# Patient Record
Sex: MALE | Race: WHITE | NOT HISPANIC OR LATINO | Employment: FULL TIME | URBAN - NONMETROPOLITAN AREA
[De-identification: names, ages, dates, MRNs, and addresses within clinical notes are randomized per-mention and may not be internally consistent; named-entity substitution may affect disease eponyms.]

---

## 2024-02-29 ENCOUNTER — OFFICE VISIT (OUTPATIENT)
Dept: FAMILY MEDICINE CLINIC | Age: 34
End: 2024-02-29
Payer: COMMERCIAL

## 2024-02-29 VITALS
WEIGHT: 283 LBS | SYSTOLIC BLOOD PRESSURE: 117 MMHG | OXYGEN SATURATION: 98 % | HEART RATE: 68 BPM | BODY MASS INDEX: 39.62 KG/M2 | HEIGHT: 71 IN | DIASTOLIC BLOOD PRESSURE: 76 MMHG

## 2024-02-29 DIAGNOSIS — F41.9 ANXIETY AND DEPRESSION: Primary | ICD-10-CM

## 2024-02-29 DIAGNOSIS — Z13.220 SCREENING CHOLESTEROL LEVEL: ICD-10-CM

## 2024-02-29 DIAGNOSIS — R53.83 OTHER FATIGUE: ICD-10-CM

## 2024-02-29 DIAGNOSIS — F32.A ANXIETY AND DEPRESSION: Primary | ICD-10-CM

## 2024-02-29 DIAGNOSIS — N52.9 ERECTILE DYSFUNCTION, UNSPECIFIED ERECTILE DYSFUNCTION TYPE: ICD-10-CM

## 2024-02-29 PROCEDURE — 99214 OFFICE O/P EST MOD 30 MIN: CPT | Performed by: NURSE PRACTITIONER

## 2024-02-29 RX ORDER — TADALAFIL 10 MG/1
10 TABLET ORAL DAILY PRN
Qty: 30 TABLET | Refills: 0 | Status: SHIPPED | OUTPATIENT
Start: 2024-02-29

## 2024-02-29 RX ORDER — QUETIAPINE FUMARATE 25 MG/1
TABLET, FILM COATED ORAL
Qty: 90 TABLET | Refills: 1 | Status: SHIPPED | OUTPATIENT
Start: 2024-02-29

## 2024-02-29 RX ORDER — SERTRALINE HYDROCHLORIDE 100 MG/1
100 TABLET, FILM COATED ORAL DAILY
Qty: 90 TABLET | Refills: 1 | Status: SHIPPED | OUTPATIENT
Start: 2024-02-29

## 2024-02-29 NOTE — ASSESSMENT & PLAN NOTE
Patient and wife report all symptoms are stable.  He was to continue current treatment.  If symptoms do not remain stable I will refer him to behavioral health.  Patient verbalizes understanding.

## 2024-02-29 NOTE — ASSESSMENT & PLAN NOTE
If testosterone remains low we should either refer him to urology or endocrinology.  Patient verbalized understanding.

## 2024-02-29 NOTE — PROGRESS NOTES
"Chief Complaint  Anxiety (1 month follow up ), Depression, Fatigue, and Results (Lab results)    Subjective          Jamin Alves presents to [facility]     Patient is a 33-year-old male who is here today to follow-up regarding anxiety, depression, insomnia, and erectile dysfunction.  Recent labs were normal with exception of a mild low testosterone level.  He would like to be tested for sleep apnea due to ongoing concerns with fatigue.  Mood is improved and he wants to continue on quetiapine 25 mg at bedtime, sertraline 100 mg daily.  Regarding erectile dysfunction, sildenafil not effective and switched to Cialis with his last visit.  5 mg daily as needed.  Insurance only allows 8 tablets/month.  He would like to try higher dose of Cialis.     Objective   Vital Signs:   Vitals:    02/29/24 1548   BP: 117/76   BP Location: Left arm   Patient Position: Sitting   Cuff Size: Large Adult   Pulse: 68   SpO2: 98%   Weight: 128 kg (283 lb)   Height: 179.1 cm (70.5\")       Wt Readings from Last 3 Encounters:   02/29/24 128 kg (283 lb)   01/23/24 127 kg (280 lb)   01/17/24 125 kg (275 lb)      BP Readings from Last 3 Encounters:   02/29/24 117/76   01/23/24 121/81   01/17/24 131/85       Body mass index is 40.03 kg/m².           Physical Exam  Vitals reviewed.   Constitutional:       General: He is not in acute distress.     Appearance: Normal appearance. He is well-developed. He is obese.   Cardiovascular:      Rate and Rhythm: Normal rate and regular rhythm.      Heart sounds: Normal heart sounds.   Pulmonary:      Effort: Pulmonary effort is normal.      Breath sounds: Normal breath sounds.   Musculoskeletal:      Right lower leg: No edema.      Left lower leg: No edema.   Skin:     General: Skin is warm and dry.   Neurological:      General: No focal deficit present.      Mental Status: He is alert.   Psychiatric:         Attention and Perception: Attention normal.         Mood and " Affect: Mood and affect normal.         Behavior: Behavior normal.           Current Outpatient Medications:     QUEtiapine (SEROquel) 25 MG tablet, Take one half tablet at bedtime x 1 week then increase to 1 tablet at bedtime, Disp: 90 tablet, Rfl: 1    sertraline (Zoloft) 100 MG tablet, Take 1 tablet by mouth Daily., Disp: 90 tablet, Rfl: 1    tadalafil (Cialis) 10 MG tablet, Take 1 tablet by mouth Daily As Needed for Erectile Dysfunction., Disp: 30 tablet, Rfl: 0   Past Medical History:   Diagnosis Date    Anxiety and depression     COVID         Insomnia     Pilonidal cyst     Vasovagal syncope     IN HIGH SCHOOL     Allergies   Allergen Reactions    Prazosin Other (See Comments)     Night terrors               Result Review :     Common labs          3/30/2023    13:07 1/23/2024    11:15   Common Labs   Glucose 78  93    BUN 16  16    Creatinine 0.80  0.87    Sodium 139  139    Potassium 4.1  4.0    Chloride 102  103    Calcium 9.6  9.6    Total Protein 7.0     Albumin 5.0  4.3    Total Bilirubin 0.4  0.4    Alkaline Phosphatase 85  86    AST (SGOT) 23  22    ALT (SGPT) 40  32    WBC  7.77    Hemoglobin  13.4    Hematocrit  40.9    Platelets  244    Total Cholesterol 128     Triglycerides 85     HDL Cholesterol 33     LDL Cholesterol  78     Hemoglobin A1C  5.40         No Images in the past 120 days found..           Social History     Tobacco Use   Smoking Status Never   Smokeless Tobacco Never           Assessment and Plan    Diagnoses and all orders for this visit:    1. Anxiety and depression (Primary)  Assessment & Plan:  Patient and wife report all symptoms are stable.  He was to continue current treatment.  If symptoms do not remain stable I will refer him to behavioral health.  Patient verbalizes understanding.    Orders:  -     sertraline (Zoloft) 100 MG tablet; Take 1 tablet by mouth Daily.  Dispense: 90 tablet; Refill: 1  -     QUEtiapine (SEROquel) 25 MG tablet; Take one half tablet at  bedtime x 1 week then increase to 1 tablet at bedtime  Dispense: 90 tablet; Refill: 1    2. Other fatigue  Assessment & Plan:  If testosterone remains low we should either refer him to urology or endocrinology.  Patient verbalized understanding.    Orders:  -     Testosterone, Free, Total; Future  -     Ambulatory Referral to Sleep Medicine    3. Erectile dysfunction, unspecified erectile dysfunction type  -     tadalafil (Cialis) 10 MG tablet; Take 1 tablet by mouth Daily As Needed for Erectile Dysfunction.  Dispense: 30 tablet; Refill: 0    4. Screening cholesterol level  -     Lipid panel; Future        Follow Up    No follow-ups on file.  Patient was given instructions and counseling regarding his condition or for health maintenance advice. Please see specific information pulled into the AVS if appropriate.

## 2024-03-19 ENCOUNTER — LAB (OUTPATIENT)
Dept: LAB | Facility: HOSPITAL | Age: 34
End: 2024-03-19
Payer: COMMERCIAL

## 2024-03-19 DIAGNOSIS — Z13.220 SCREENING CHOLESTEROL LEVEL: ICD-10-CM

## 2024-03-19 DIAGNOSIS — R53.83 OTHER FATIGUE: ICD-10-CM

## 2024-03-19 LAB
CHOLEST SERPL-MCNC: 128 MG/DL (ref 0–200)
HDLC SERPL-MCNC: 40 MG/DL (ref 40–60)
LDLC SERPL CALC-MCNC: 75 MG/DL (ref 0–100)
LDLC/HDLC SERPL: 1.89 {RATIO}
TRIGL SERPL-MCNC: 62 MG/DL (ref 0–150)
VLDLC SERPL-MCNC: 13 MG/DL (ref 5–40)

## 2024-03-19 PROCEDURE — 36415 COLL VENOUS BLD VENIPUNCTURE: CPT

## 2024-03-19 PROCEDURE — 84403 ASSAY OF TOTAL TESTOSTERONE: CPT

## 2024-03-19 PROCEDURE — 80061 LIPID PANEL: CPT

## 2024-03-19 PROCEDURE — 84402 ASSAY OF FREE TESTOSTERONE: CPT

## 2024-03-22 LAB
TESTOST FREE SERPL-MCNC: 5.9 PG/ML (ref 8.7–25.1)
TESTOST SERPL-MCNC: 144 NG/DL (ref 264–916)

## 2024-03-25 NOTE — PROGRESS NOTES
Your cholesterol panel is normal.  Testosterone is improved but still considered low.  Please let me know if you would like referral to urology or endocrinology for further evaluation of your testosterone levels.

## 2024-03-27 DIAGNOSIS — R79.89 LOW TESTOSTERONE: Primary | ICD-10-CM

## 2024-04-04 ENCOUNTER — TELEPHONE (OUTPATIENT)
Dept: FAMILY MEDICINE CLINIC | Age: 34
End: 2024-04-04
Payer: COMMERCIAL

## 2024-04-04 NOTE — TELEPHONE ENCOUNTER
[facility]   Dr. Roberts   [address] [place] [id number]  05- @ 1:45 pm     Patient informed  mlb

## 2024-04-04 NOTE — TELEPHONE ENCOUNTER
Caller: Jamin Alves    Relationship: Self    Best call back number: [phone]     What is the best time to reach you: ANY    Who are you requesting to speak with (clinical staff, provider,  specific staff member): REFERRALS      What was the call regarding: PATIENT NEEDS TO KNOW THE NAME OF PLACE WE REFERRED HIM TO FOR SLEEP STUDY

## 2024-04-12 ENCOUNTER — OFFICE VISIT (OUTPATIENT)
Dept: UROLOGY | Facility: CLINIC | Age: 34
End: 2024-04-12
Payer: COMMERCIAL

## 2024-04-12 VITALS
SYSTOLIC BLOOD PRESSURE: 117 MMHG | DIASTOLIC BLOOD PRESSURE: 69 MMHG | HEIGHT: 71 IN | WEIGHT: 285.8 LBS | BODY MASS INDEX: 40.01 KG/M2 | HEART RATE: 78 BPM

## 2024-04-12 DIAGNOSIS — F52.4 PREMATURE EJACULATION: ICD-10-CM

## 2024-04-12 DIAGNOSIS — E29.1 HYPOGONADISM IN MALE: Primary | ICD-10-CM

## 2024-04-12 DIAGNOSIS — R53.83 LACK OF ENERGY: ICD-10-CM

## 2024-04-12 DIAGNOSIS — Z79.890 LONG-TERM CURRENT USE OF TESTOSTERONE REPLACEMENT THERAPY: ICD-10-CM

## 2024-04-12 DIAGNOSIS — R79.89 LOW TESTOSTERONE IN MALE: ICD-10-CM

## 2024-04-12 DIAGNOSIS — N50.3 EPIDIDYMAL CYST: ICD-10-CM

## 2024-04-12 LAB
BILIRUB BLD-MCNC: NEGATIVE MG/DL
CLARITY, POC: CLEAR
COLOR UR: YELLOW
EXPIRATION DATE: ABNORMAL
GLUCOSE UR STRIP-MCNC: NEGATIVE MG/DL
KETONES UR QL: NEGATIVE
LEUKOCYTE EST, POC: NEGATIVE
Lab: ABNORMAL
NITRITE UR-MCNC: NEGATIVE MG/ML
PH UR: 6 [PH] (ref 5–8)
PROT UR STRIP-MCNC: NEGATIVE MG/DL
RBC # UR STRIP: ABNORMAL /UL
SP GR UR: 1.02 (ref 1–1.03)
UROBILINOGEN UR QL: ABNORMAL

## 2024-04-12 RX ORDER — TESTOSTERONE CYPIONATE 200 MG/ML
200 VIAL (ML) INTRAMUSCULAR
Qty: 6 ML | Refills: 1 | Status: SHIPPED | OUTPATIENT
Start: 2024-04-12 | End: 2024-10-09

## 2024-04-12 NOTE — PROGRESS NOTES
"Chief Complaint    Hypogonadism    Premature ejaculation      Subjective no acute distress        Jamin Alves presents to [facility]  History of Present Illness    33-year-old white male has lack of energy and lack of sex drive for about 2 years now.  Patient has no real problems with the erection except sometime he cannot maintain it.  Patient is on Zoloft for depression but is not helping his premature ejaculation.    Patient is urinating without difficulties and does not have history of prostate cancer in the family.  Patient does have depression and PTSD.    No history of sleep apnea but is going to have testing done in May.    Objective no acute distress  Vital Signs:   /69 (BP Location: Left arm, Patient Position: Lying, Cuff Size: Adult)   Pulse 78   Ht 179.1 cm (70.5\")   Wt 130 kg (285 lb 12.8 oz)   BMI 40.43 kg/m²     Allergies   Allergen Reactions    Prazosin Other (See Comments)     Night terrors      Past medical history:  has a past medical history of Anxiety and depression, COVID, Insomnia, Pilonidal cyst, and Vasovagal syncope.   Past surgical history:  has a past surgical history that includes Toe Surgery; Other surgical history; and Pilonidal Cystectomy (N/A, 8/25/2022).  Personal history: family history includes Arthritis in his maternal grandmother and paternal grandmother; Cancer in his maternal grandfather and paternal grandfather; Diabetes in his maternal grandmother; Hyperlipidemia in his mother; Hypertension in his mother.  Social history:  reports that he has never smoked. He has never used smokeless tobacco. He reports current alcohol use. He reports that he does not use drugs.    Review of Systems    Please see past medical and surgical history    Physical Exam  Constitutional:       General: He is not in acute distress.     Appearance: Normal appearance. He is obese. He is not ill-appearing or toxic-appearing.   HENT:      Head: Normocephalic and " atraumatic.      Ears:      Comments: No loss of hearing  Cardiovascular:      Rate and Rhythm: Normal rate and regular rhythm.      Pulses: Normal pulses.      Heart sounds: Normal heart sounds. No murmur heard.  Pulmonary:      Effort: Pulmonary effort is normal. No respiratory distress.      Breath sounds: Normal breath sounds. No rhonchi or rales.   Abdominal:      Palpations: Abdomen is soft. There is no mass.      Tenderness: There is no abdominal tenderness. There is no right CVA tenderness or left CVA tenderness.   Genitourinary:     Comments: Penis is normal.  Does have a fold on the coronal.  Penis is retracted.    Right left scrotum is normal.    Left testicle is normal and he has epididymal cyst on the left epididymis.    Right testicle is normal and has epididymal cyst on the left right side.    Both the testicles are not normal size but negative but smaller.    TONY.  Prostate gland is just about 15 g and benign  Musculoskeletal:         General: No swelling. Normal range of motion.      Cervical back: Normal range of motion and neck supple. No rigidity or tenderness.   Lymphadenopathy:      Cervical: No cervical adenopathy.   Skin:     General: Skin is warm.      Coloration: Skin is not jaundiced.   Neurological:      General: No focal deficit present.      Mental Status: He is alert and oriented to person, place, and time.      Motor: No weakness.      Gait: Gait normal.   Psychiatric:         Mood and Affect: Mood normal.         Behavior: Behavior normal.         Thought Content: Thought content normal.         Judgment: Judgment normal.        Result Review :                 Assessment and Plan    Diagnoses and all orders for this visit:    1. Low testosterone in male (Primary)  -     POC Urinalysis Dipstick, Automated    2. Hypogonadism in male    3. Lack of energy    4. Premature ejaculation    5. Epididymal cyst    Patient testosterone on 3/19/2024 was 144 and free testosterone was 5.9.  Before  that his testosterone was under 61 and free T3 was 5.3.  Patient does deserve to be on testosterone and we will start him on testosterone cypionate 200 mg intramuscular q. 14 days.  Risk and benefits have been discussedI have discussed the side effects of testosterone with the patient.  Patient can have borderline hypertension which can give rise to cardiac issues.  Patient can have erythrocytosis and elevated erythropoietin and may have to donate blood depending upon the CBC.  Testosterone perse he has no relationship with inducing prostate cancer but if patient develop prostate cancer it will spread the tumor and its growth.  Patient can have thrombophlebitis of lower extremities and even pulmonary embolus.  Patient can also have sleep apnea and testosterone will exaggerate the situation.  In some trials patient has developed chronic kidney disease with testosterone therapy.  Patient has 6 frozen embryos so I do not think he is concerned about his sperm count at this time.     Brief Urine Lab Results  (Last result in the past 365 days)        Color   Clarity   Blood   Leuk Est   Nitrite   Protein   CREAT   Urine HCG        04/12/24 1313 Yellow   Clear   Trace   Negative   Negative   Negative                    Follow Up   No follow-ups on file.  Patient was given instructions and counseling regarding his condition or for health maintenance advice. Please see specific information pulled into the AVS if appropriate.     Claudia Sharpe MD

## 2024-04-16 DIAGNOSIS — F41.9 ANXIETY AND DEPRESSION: Primary | ICD-10-CM

## 2024-04-16 DIAGNOSIS — F32.A ANXIETY AND DEPRESSION: Primary | ICD-10-CM

## 2024-04-16 RX ORDER — PAROXETINE HYDROCHLORIDE 20 MG/1
20 TABLET, FILM COATED ORAL EVERY MORNING
Qty: 30 TABLET | Refills: 5 | Status: SHIPPED | OUTPATIENT
Start: 2024-04-16

## 2024-05-03 DIAGNOSIS — F32.A ANXIETY AND DEPRESSION: Primary | ICD-10-CM

## 2024-05-03 DIAGNOSIS — F41.9 ANXIETY AND DEPRESSION: Primary | ICD-10-CM

## 2024-05-03 RX ORDER — PAROXETINE HYDROCHLORIDE 40 MG/1
40 TABLET, FILM COATED ORAL EVERY MORNING
Qty: 30 TABLET | Refills: 5 | Status: SHIPPED | OUTPATIENT
Start: 2024-05-03

## 2024-05-17 ENCOUNTER — OFFICE VISIT (OUTPATIENT)
Dept: FAMILY MEDICINE CLINIC | Age: 34
End: 2024-05-17
Payer: COMMERCIAL

## 2024-05-17 VITALS
HEIGHT: 71 IN | WEIGHT: 282 LBS | OXYGEN SATURATION: 97 % | HEART RATE: 83 BPM | SYSTOLIC BLOOD PRESSURE: 119 MMHG | DIASTOLIC BLOOD PRESSURE: 81 MMHG | BODY MASS INDEX: 39.48 KG/M2

## 2024-05-17 DIAGNOSIS — B35.0 TINEA CAPITIS: Primary | ICD-10-CM

## 2024-05-17 PROCEDURE — 99213 OFFICE O/P EST LOW 20 MIN: CPT | Performed by: NURSE PRACTITIONER

## 2024-05-17 RX ORDER — KETOCONAZOLE 20 MG/ML
SHAMPOO TOPICAL 2 TIMES WEEKLY
Qty: 120 ML | Refills: 0 | Status: SHIPPED | OUTPATIENT
Start: 2024-05-20

## 2024-05-17 RX ORDER — CLOTRIMAZOLE AND BETAMETHASONE DIPROPIONATE 10; .64 MG/G; MG/G
1 CREAM TOPICAL 2 TIMES DAILY
Qty: 45 G | Refills: 0 | Status: SHIPPED | OUTPATIENT
Start: 2024-05-17 | End: 2024-05-31

## 2024-05-17 NOTE — PROGRESS NOTES
Chief Complaint  Jamin Alves presents to [facility] for Headache (Patient c/o patch on head is painful since today states his ivory found it when cutting his hair today )      Subjective     History of Present Illness    Jamin is in today to regarding a balding area o nthe back of his head. This was noticed about a week ago being a dry patch/ somewhat pruritic and then his ivory noticed it as well.  No previous issues in the past He does have pets.  No one else in the home with similar symptoms .      Assessment and Plan       Diagnoses and all orders for this visit:    1. Tinea capitis (Primary)  Comments:  treat with antifungal x 2 weeks  he will use shampoo 2 time weekly for 2 weeks , the topical cream BID x 2 weeks then f/u with PCP if persists for referral  Orders:  -     ketoconazole (NIZORAL) 2 % shampoo; Apply  topically to the appropriate area as directed 2 (Two) Times a Week.  Dispense: 120 mL; Refill: 0  -     clotrimazole-betamethasone (Lotrisone) 1-0.05 % cream; Apply 1 Application topically to the appropriate area as directed 2 (Two) Times a Day for 14 days.  Dispense: 45 g; Refill: 0            Follow Up   Return in about 4 weeks (around 6/14/2024) for Follow up with PCP as recommended.  Future Appointments   Date Time Provider Department Center   6/14/2024  3:00 PM Maria Isabel Cosme APRN [facility] [place] [place] [facility]       New Medications Ordered This Visit   Medications    ketoconazole (NIZORAL) 2 % shampoo     Sig: Apply  topically to the appropriate area as directed 2 (Two) Times a Week.     Dispense:  120 mL     Refill:  0    clotrimazole-betamethasone (Lotrisone) 1-0.05 % cream     Sig: Apply 1 Application topically to the appropriate area as directed 2 (Two) Times a Day for 14 days.     Dispense:  45 g     Refill:  0       Medications Discontinued During This Encounter   Medication Reason    tadalafil (Cialis) 10 MG tablet Non-compliance          Review of  "Systems    Objective     Vitals:    05/17/24 1551   BP: 119/81   BP Location: Left arm   Patient Position: Sitting   Cuff Size: Large Adult   Pulse: 83   SpO2: 97%   Weight: 128 kg (282 lb)   Height: 179.1 cm (70.5\")     Body mass index is 39.89 kg/m².         Physical Exam  Constitutional:       Appearance: Normal appearance.   HENT:      Head: Normocephalic.   Pulmonary:      Effort: Pulmonary effort is normal.   Musculoskeletal:      Cervical back: Normal range of motion.   Skin:         Neurological:      Mental Status: He is alert and oriented to person, place, and time.   Psychiatric:         Mood and Affect: Mood normal.         Behavior: Behavior normal.            Result Review               Allergies   Allergen Reactions    Prazosin Other (See Comments)     Night terrors      Past Medical History:   Diagnosis Date    Anxiety and depression     COVID         Insomnia     Pilonidal cyst     Vasovagal syncope     IN HIGH SCHOOL     Current Outpatient Medications   Medication Sig Dispense Refill    PARoxetine (Paxil) 40 MG tablet Take 1 tablet by mouth Every Morning. 30 tablet 5    QUEtiapine (SEROquel) 25 MG tablet Take one half tablet at bedtime x 1 week then increase to 1 tablet at bedtime 90 tablet 1    Syringe/Needle, Disp, 25G X 1\" 3 ML misc Use 200 mg Every 14 (Fourteen) Days for 180 days. 10 each 10    Testosterone Cypionate 200 MG/ML solution Inject 1 mL as directed Every 14 (Fourteen) Days for 180 days. 6 mL 1    clotrimazole-betamethasone (Lotrisone) 1-0.05 % cream Apply 1 Application topically to the appropriate area as directed 2 (Two) Times a Day for 14 days. 45 g 0    [START ON 5/20/2024] ketoconazole (NIZORAL) 2 % shampoo Apply  topically to the appropriate area as directed 2 (Two) Times a Week. 120 mL 0     No current facility-administered medications for this visit.     Past Surgical History:   Procedure Laterality Date    OTHER SURGICAL HISTORY      sx on forehead as a child due to " dog bite    PILONIDAL CYSTECTOMY N/A 8/25/2022    Procedure: PILONIDAL CYSTECTOMY;  Surgeon: Amy Elizabeth MD;  [place] [facility] [place] [facility] [place] [facility] [place]: General;  Laterality: N/A;    TOE SURGERY        Health Maintenance Due   Topic Date Due    TDAP/TD VACCINES (1 - Tdap) Never done    BMI FOLLOWUP  08/15/2023    ANNUAL PHYSICAL  04/10/2024      Immunization History   Administered Date(s) Administered    COVID-19 (PFIZER) Purple Cap Monovalent 03/18/2021, 04/08/2021    Fluzone (or Fluarix & Flulaval for VFC) >6mos 09/26/2022         Part of this note may be an electronic transcription/translation of spoken language to printed   text using the Dragon Dictation System.      JOI Rahman

## 2024-06-10 DIAGNOSIS — F41.9 ANXIETY AND DEPRESSION: Primary | ICD-10-CM

## 2024-06-10 DIAGNOSIS — F32.A ANXIETY AND DEPRESSION: Primary | ICD-10-CM

## 2024-06-10 RX ORDER — SERTRALINE HYDROCHLORIDE 100 MG/1
100 TABLET, FILM COATED ORAL DAILY
Qty: 90 TABLET | Refills: 1 | Status: SHIPPED | OUTPATIENT
Start: 2024-06-10

## 2024-06-19 ENCOUNTER — OFFICE VISIT (OUTPATIENT)
Dept: FAMILY MEDICINE CLINIC | Age: 34
End: 2024-06-19
Payer: COMMERCIAL

## 2024-06-19 VITALS
HEART RATE: 53 BPM | WEIGHT: 272 LBS | BODY MASS INDEX: 38.08 KG/M2 | TEMPERATURE: 98 F | SYSTOLIC BLOOD PRESSURE: 111 MMHG | DIASTOLIC BLOOD PRESSURE: 73 MMHG | HEIGHT: 71 IN

## 2024-06-19 DIAGNOSIS — Z86.19 HISTORY OF TINEA CAPITIS: ICD-10-CM

## 2024-06-19 DIAGNOSIS — L65.9 HAIR LOSS: Primary | ICD-10-CM

## 2024-06-19 PROBLEM — B35.0 TINEA CAPITIS: Status: ACTIVE | Noted: 2024-06-19

## 2024-06-19 PROCEDURE — 99213 OFFICE O/P EST LOW 20 MIN: CPT | Performed by: NURSE PRACTITIONER

## 2024-06-19 RX ORDER — KETOCONAZOLE 20 MG/ML
SHAMPOO TOPICAL 2 TIMES WEEKLY
Qty: 120 ML | Refills: 0 | Status: SHIPPED | OUTPATIENT
Start: 2024-06-20

## 2024-06-19 NOTE — ASSESSMENT & PLAN NOTE
Discussed possibly using terbinafine or fluconazole by mouth.  Due to other longer standing issues or concerns with hair loss we will go ahead and refer to dermatology.  Continue use of shampoo.

## 2024-06-19 NOTE — PROGRESS NOTES
"Chief Complaint  Tinea capitis (4wk f/u seen MM)    Subjective          Jamin Alves presents to [facility]     Patient is a 33-year-old male who is here today to follow-up regarding tinea capitis.  Has been using ketoconazole shampoo and applying an antifungal cream with some improvement.  Patient also has some concerns ongoing regarding hair loss both diffuse and patchy.  Would like a referral to dermatology.    Anxiety depression currently stable on Zoloft 100 mg daily and quetiapine 25 mg at bedtime.  Was recently tried on Paxil rather than Zoloft.  But patient felt his symptoms worsened on Paxil.  Paxil was discontinued and Zoloft was restarted.    He will start CPAP tonight for treatment of sleep apnea.     Objective   Vital Signs:   Vitals:    06/19/24 1333   BP: 111/73   BP Location: Left arm   Patient Position: Sitting   Pulse: 53   Temp: 98 °F (36.7 °C)   TempSrc: Oral   Weight: 123 kg (272 lb)   Height: 179.1 cm (70.5\")       Wt Readings from Last 3 Encounters:   06/19/24 123 kg (272 lb)   05/17/24 128 kg (282 lb)   04/12/24 130 kg (285 lb 12.8 oz)      BP Readings from Last 3 Encounters:   06/19/24 111/73   05/17/24 119/81   04/12/24 117/69       Body mass index is 38.48 kg/m².           Physical Exam  Vitals reviewed.   Constitutional:       General: He is not in acute distress.     Appearance: Normal appearance. He is well-developed.   HENT:      Head:        Comments: More diffuse hair loss on the top of the scalp  Cardiovascular:      Rate and Rhythm: Normal rate and regular rhythm.      Heart sounds: Normal heart sounds.   Pulmonary:      Effort: Pulmonary effort is normal.      Breath sounds: Normal breath sounds.   Musculoskeletal:      Right lower leg: No edema.      Left lower leg: No edema.   Skin:     General: Skin is warm and dry.   Neurological:      General: No focal deficit present.      Mental Status: He is alert.   Psychiatric:         Attention and " "Perception: Attention normal.         Mood and Affect: Mood and affect normal.         Behavior: Behavior normal.           Current Outpatient Medications:     [START ON 6/20/2024] ketoconazole (NIZORAL) 2 % shampoo, Apply  topically to the appropriate area as directed 2 (Two) Times a Week., Disp: 120 mL, Rfl: 0    QUEtiapine (SEROquel) 25 MG tablet, Take one half tablet at bedtime x 1 week then increase to 1 tablet at bedtime, Disp: 90 tablet, Rfl: 1    sertraline (Zoloft) 100 MG tablet, Take 1 tablet by mouth Daily., Disp: 90 tablet, Rfl: 1    Syringe/Needle, Disp, 25G X 1\" 3 ML misc, Use 200 mg Every 14 (Fourteen) Days for 180 days., Disp: 10 each, Rfl: 10    Testosterone Cypionate 200 MG/ML solution, Inject 1 mL as directed Every 14 (Fourteen) Days for 180 days., Disp: 6 mL, Rfl: 1   Past Medical History:   Diagnosis Date    Anxiety and depression     COVID         Insomnia     Pilonidal cyst     Vasovagal syncope     IN HIGH SCHOOL     Allergies   Allergen Reactions    Prazosin Other (See Comments)     Night terrors               Result Review :     Common labs          1/23/2024    11:15 3/19/2024    13:05   Common Labs   Glucose 93     BUN 16     Creatinine 0.87     Sodium 139     Potassium 4.0     Chloride 103     Calcium 9.6     Albumin 4.3     Total Bilirubin 0.4     Alkaline Phosphatase 86     AST (SGOT) 22     ALT (SGPT) 32     WBC 7.77     Hemoglobin 13.4     Hematocrit 40.9     Platelets 244     Total Cholesterol  128    Triglycerides  62    HDL Cholesterol  40    LDL Cholesterol   75    Hemoglobin A1C 5.40          No Images in the past 120 days found..           Social History     Tobacco Use   Smoking Status Never   Smokeless Tobacco Never           Assessment and Plan    Diagnoses and all orders for this visit:    1. Hair loss (Primary)  -     Ambulatory Referral to Dermatology    2. History of tinea capitis  Assessment & Plan:  Discussed possibly using terbinafine or fluconazole by mouth.  " Due to other longer standing issues or concerns with hair loss we will go ahead and refer to dermatology.  Continue use of shampoo.    Orders:  -     Ambulatory Referral to Dermatology  -     ketoconazole (NIZORAL) 2 % shampoo; Apply  topically to the appropriate area as directed 2 (Two) Times a Week.  Dispense: 120 mL; Refill: 0        Follow Up    No follow-ups on file.  Patient was given instructions and counseling regarding his condition or for health maintenance advice. Please see specific information pulled into the AVS if appropriate.

## 2024-08-20 DIAGNOSIS — E66.9 CLASS 2 OBESITY WITHOUT SERIOUS COMORBIDITY WITH BODY MASS INDEX (BMI) OF 39.0 TO 39.9 IN ADULT, UNSPECIFIED OBESITY TYPE: Primary | ICD-10-CM

## 2024-09-03 DIAGNOSIS — Z79.890 LONG-TERM CURRENT USE OF TESTOSTERONE REPLACEMENT THERAPY: ICD-10-CM

## 2024-09-03 DIAGNOSIS — E29.1 HYPOGONADISM IN MALE: Primary | ICD-10-CM

## 2024-09-03 DIAGNOSIS — E29.1 HYPOGONADISM IN MALE: ICD-10-CM

## 2024-09-03 RX ORDER — TESTOSTERONE CYPIONATE 200 MG/ML
200 VIAL (ML) INTRAMUSCULAR
Qty: 2 ML | Refills: 0 | Status: SHIPPED | OUTPATIENT
Start: 2024-09-03 | End: 2024-10-03

## 2024-09-03 RX ORDER — TESTOSTERONE CYPIONATE 200 MG/ML
INJECTION, SOLUTION INTRAMUSCULAR
Qty: 6 ML | OUTPATIENT
Start: 2024-09-03

## 2024-09-03 NOTE — TELEPHONE ENCOUNTER
I will call patient today to set up another appointment before this refill. Does he need certain labs before his appointment?

## 2024-09-03 NOTE — TELEPHONE ENCOUNTER
Called patient and let him be aware of labs that need to be drawn before his appointment on 10/8. Patient is aware to be fasting and to get these labs done 2 weeks prior to his appointment. Patient is aware his testosterone is at his pharmacy.

## 2024-11-05 ENCOUNTER — OFFICE VISIT (OUTPATIENT)
Dept: INTERNAL MEDICINE | Age: 34
End: 2024-11-05
Payer: COMMERCIAL

## 2024-11-05 VITALS
OXYGEN SATURATION: 96 % | HEIGHT: 71 IN | DIASTOLIC BLOOD PRESSURE: 81 MMHG | TEMPERATURE: 98.2 F | SYSTOLIC BLOOD PRESSURE: 130 MMHG | HEART RATE: 70 BPM | WEIGHT: 284 LBS | BODY MASS INDEX: 39.76 KG/M2

## 2024-11-05 DIAGNOSIS — E29.1 HYPOGONADISM, MALE: ICD-10-CM

## 2024-11-05 DIAGNOSIS — Z23 NEED FOR VACCINATION: Primary | ICD-10-CM

## 2024-11-05 DIAGNOSIS — E66.01 CLASS 3 SEVERE OBESITY DUE TO EXCESS CALORIES WITH SERIOUS COMORBIDITY AND BODY MASS INDEX (BMI) OF 40.0 TO 44.9 IN ADULT: ICD-10-CM

## 2024-11-05 DIAGNOSIS — F51.01 PRIMARY INSOMNIA: ICD-10-CM

## 2024-11-05 DIAGNOSIS — E66.813 CLASS 3 SEVERE OBESITY DUE TO EXCESS CALORIES WITH SERIOUS COMORBIDITY AND BODY MASS INDEX (BMI) OF 40.0 TO 44.9 IN ADULT: ICD-10-CM

## 2024-11-05 PROCEDURE — 90656 IIV3 VACC NO PRSV 0.5 ML IM: CPT | Performed by: INTERNAL MEDICINE

## 2024-11-05 PROCEDURE — 99214 OFFICE O/P EST MOD 30 MIN: CPT | Performed by: INTERNAL MEDICINE

## 2024-11-05 PROCEDURE — 90471 IMMUNIZATION ADMIN: CPT | Performed by: INTERNAL MEDICINE

## 2024-11-05 NOTE — PROGRESS NOTES
"CHIEF COMPLAINT/ HPI:  Establish Care (New pt establish care. Pt is non fasting. Pt is wanting to discuss ozempic/ injections for weight loss. Pt has tried diet / working out and has been not successful. Pt is wondering if he is insulin resistant. Flu shot request. )              Objective   Vital Signs  Vitals:    11/05/24 0922   BP: 130/81   BP Location: Left arm   Patient Position: Sitting   Pulse: 70   Temp: 98.2 °F (36.8 °C)   SpO2: 96%   Weight: 129 kg (284 lb)   Height: 179.1 cm (70.51\")      Body mass index is 40.16 kg/m².  Review of Systems   Constitutional: Negative.    HENT: Negative.     Eyes: Negative.    Respiratory: Negative.     Cardiovascular: Negative.    Gastrointestinal: Negative.    Endocrine: Negative.    Genitourinary: Negative.    Musculoskeletal: Negative.    Allergic/Immunologic: Negative.    Neurological: Negative.    Hematological: Negative.    Psychiatric/Behavioral: Negative.        Physical Exam  Constitutional:       General: He is not in acute distress.     Appearance: Normal appearance.   HENT:      Head: Normocephalic.      Mouth/Throat:      Mouth: Mucous membranes are moist.   Eyes:      Conjunctiva/sclera: Conjunctivae normal.      Pupils: Pupils are equal, round, and reactive to light.   Cardiovascular:      Rate and Rhythm: Normal rate and regular rhythm.      Pulses: Normal pulses.      Heart sounds: Normal heart sounds.   Pulmonary:      Effort: Pulmonary effort is normal.      Breath sounds: Normal breath sounds.   Abdominal:      General: Abdomen is flat. Bowel sounds are normal.      Palpations: Abdomen is soft.   Musculoskeletal:         General: No swelling. Normal range of motion.      Cervical back: Neck supple.   Skin:     General: Skin is warm and dry.      Coloration: Skin is not jaundiced.   Neurological:      General: No focal deficit present.      Mental Status: He is alert and oriented to person, place, and time. Mental status is at baseline.   Psychiatric:    " "     Mood and Affect: Mood normal.         Behavior: Behavior normal.         Thought Content: Thought content normal.         Judgment: Judgment normal.        Result Review :   No results found for: \"PROBNP\", \"BNP\"  CMP          1/23/2024    11:15   CMP   Glucose 93    BUN 16    Creatinine 0.87    EGFR 116.1    Sodium 139    Potassium 4.0    Chloride 103    Calcium 9.6    Total Protein 7.4    Albumin 4.3    Globulin 3.1    Total Bilirubin 0.4    Alkaline Phosphatase 86    AST (SGOT) 22    ALT (SGPT) 32    Albumin/Globulin Ratio 1.4    BUN/Creatinine Ratio 18.4    Anion Gap 7.5      CBC w/diff          1/23/2024    11:15   CBC w/Diff   WBC 7.77    RBC 4.56    Hemoglobin 13.4    Hematocrit 40.9    MCV 89.7    MCH 29.4    MCHC 32.8    RDW 12.6    Platelets 244    Neutrophil Rel % 62.5    Immature Granulocyte Rel % 0.4    Lymphocyte Rel % 26.6    Monocyte Rel % 8.1    Eosinophil Rel % 1.8    Basophil Rel % 0.6       Lipid Panel          3/19/2024    13:05   Lipid Panel   Total Cholesterol 128    Triglycerides 62    HDL Cholesterol 40    VLDL Cholesterol 13    LDL Cholesterol  75    LDL/HDL Ratio 1.89       Lab Results   Component Value Date    TSH 1.350 01/23/2024    TSH 2.660 04/28/2021      No results found for: \"FREET4\"   A1C Last 3 Results          1/23/2024    11:15   HGBA1C Last 3 Results   Hemoglobin A1C 5.40                        Visit Diagnoses:    ICD-10-CM ICD-9-CM   1. Class 3 severe obesity due to excess calories with serious comorbidity and body mass index (BMI) of 40.0 to 44.9 in adult  E66.813 278.01    E66.01 V85.41    Z68.41    2. Primary insomnia  F51.01 307.42   3. Hypogonadism, male  E29.1 257.2       Assessment and Plan   Diagnoses and all orders for this visit:    1. Class 3 severe obesity due to excess calories with serious comorbidity and body mass index (BMI) of 40.0 to 44.9 in adult (Primary)  -     Lipid Panel; Future  -     Hemoglobin A1c; Future  -     Comprehensive Metabolic Panel; " Future  -     CBC & Differential; Future  -     TSH+Free T4; Future  -     Vitamin B12 anemia; Future  -     Folate anemia; Future  -     Vitamin D,25-Hydroxy; Future  -     Testosterone; Future    2. Primary insomnia  -     Lipid Panel; Future  -     Hemoglobin A1c; Future  -     Comprehensive Metabolic Panel; Future  -     CBC & Differential; Future  -     TSH+Free T4; Future  -     Vitamin B12 anemia; Future  -     Folate anemia; Future  -     Vitamin D,25-Hydroxy; Future  -     Testosterone; Future    3. Hypogonadism, male  -     Lipid Panel; Future  -     Hemoglobin A1c; Future  -     Comprehensive Metabolic Panel; Future  -     CBC & Differential; Future  -     TSH+Free T4; Future  -     Vitamin B12 anemia; Future  -     Folate anemia; Future  -     Vitamin D,25-Hydroxy; Future  -     Testosterone; Future    Other orders  -     Semaglutide-Weight Management 0.25 MG/0.5ML solution auto-injector; Inject 0.05 mL under the skin into the appropriate area as directed 1 (One) Time Per Week.  Dispense: 2 mL; Refill: 2        Obesity, discussed wgt loss meds , GLP1   --WILL TRY WEGOVY SEND IN NOVEMBER 5, 2024    AVIVA, MILD , CONT CPAP    INSOMNIA, CONT SEROQUEL, 25 MG QHS     DEPRESSION, / BIPOLAR / PTSD -- UP AND DOWN, CONT ZOLOFT 100 MG QD AND SEROQUEL 2 MG QHS     HYPOGONADISM ,    Follow Up   Return in about 4 weeks (around 12/3/2024).  Patient was given instructions and counseling regarding his condition or for health maintenance advice. Please see specific information pulled into the AVS if appropriate.

## 2024-11-06 ENCOUNTER — LAB (OUTPATIENT)
Dept: LAB | Facility: HOSPITAL | Age: 34
End: 2024-11-06
Payer: COMMERCIAL

## 2024-11-06 DIAGNOSIS — E66.813 CLASS 3 SEVERE OBESITY DUE TO EXCESS CALORIES WITH SERIOUS COMORBIDITY AND BODY MASS INDEX (BMI) OF 40.0 TO 44.9 IN ADULT: ICD-10-CM

## 2024-11-06 DIAGNOSIS — E29.1 HYPOGONADISM IN MALE: ICD-10-CM

## 2024-11-06 DIAGNOSIS — Z79.890 LONG-TERM CURRENT USE OF TESTOSTERONE REPLACEMENT THERAPY: ICD-10-CM

## 2024-11-06 DIAGNOSIS — E29.1 HYPOGONADISM, MALE: ICD-10-CM

## 2024-11-06 DIAGNOSIS — E66.01 CLASS 3 SEVERE OBESITY DUE TO EXCESS CALORIES WITH SERIOUS COMORBIDITY AND BODY MASS INDEX (BMI) OF 40.0 TO 44.9 IN ADULT: ICD-10-CM

## 2024-11-06 DIAGNOSIS — F51.01 PRIMARY INSOMNIA: ICD-10-CM

## 2024-11-06 LAB
25(OH)D3 SERPL-MCNC: 11.9 NG/ML (ref 30–100)
ALBUMIN SERPL-MCNC: 4.3 G/DL (ref 3.5–5.2)
ALBUMIN/GLOB SERPL: 1.4 G/DL
ALP SERPL-CCNC: 87 U/L (ref 39–117)
ALT SERPL W P-5'-P-CCNC: 28 U/L (ref 1–41)
ANION GAP SERPL CALCULATED.3IONS-SCNC: 7.5 MMOL/L (ref 5–15)
AST SERPL-CCNC: 19 U/L (ref 1–40)
BASOPHILS # BLD AUTO: 0.05 10*3/MM3 (ref 0–0.2)
BASOPHILS NFR BLD AUTO: 0.7 % (ref 0–1.5)
BILIRUB SERPL-MCNC: 0.4 MG/DL (ref 0–1.2)
BUN SERPL-MCNC: 12 MG/DL (ref 6–20)
BUN/CREAT SERPL: 12.1 (ref 7–25)
CALCIUM SPEC-SCNC: 9.4 MG/DL (ref 8.6–10.5)
CHLORIDE SERPL-SCNC: 105 MMOL/L (ref 98–107)
CHOLEST SERPL-MCNC: 127 MG/DL (ref 0–200)
CO2 SERPL-SCNC: 26.5 MMOL/L (ref 22–29)
CREAT SERPL-MCNC: 0.99 MG/DL (ref 0.76–1.27)
DEPRECATED RDW RBC AUTO: 43.2 FL (ref 37–54)
EGFRCR SERPLBLD CKD-EPI 2021: 101.9 ML/MIN/1.73
EOSINOPHIL # BLD AUTO: 0.17 10*3/MM3 (ref 0–0.4)
EOSINOPHIL NFR BLD AUTO: 2.3 % (ref 0.3–6.2)
ERYTHROCYTE [DISTWIDTH] IN BLOOD BY AUTOMATED COUNT: 12.9 % (ref 12.3–15.4)
FSH SERPL-ACNC: 3.59 MIU/ML
GLOBULIN UR ELPH-MCNC: 3 GM/DL
GLUCOSE SERPL-MCNC: 87 MG/DL (ref 65–99)
HBA1C MFR BLD: 5.1 % (ref 4.8–5.6)
HCT VFR BLD AUTO: 42.2 % (ref 37.5–51)
HDLC SERPL-MCNC: 35 MG/DL (ref 40–60)
HGB BLD-MCNC: 13.7 G/DL (ref 13–17.7)
IMM GRANULOCYTES # BLD AUTO: 0.01 10*3/MM3 (ref 0–0.05)
IMM GRANULOCYTES NFR BLD AUTO: 0.1 % (ref 0–0.5)
LDLC SERPL CALC-MCNC: 76 MG/DL (ref 0–100)
LDLC/HDLC SERPL: 2.17 {RATIO}
LH SERPL-ACNC: 2.66 MIU/ML
LYMPHOCYTES # BLD AUTO: 1.97 10*3/MM3 (ref 0.7–3.1)
LYMPHOCYTES NFR BLD AUTO: 26.7 % (ref 19.6–45.3)
MCH RBC QN AUTO: 29.4 PG (ref 26.6–33)
MCHC RBC AUTO-ENTMCNC: 32.5 G/DL (ref 31.5–35.7)
MCV RBC AUTO: 90.6 FL (ref 79–97)
MONOCYTES # BLD AUTO: 0.65 10*3/MM3 (ref 0.1–0.9)
MONOCYTES NFR BLD AUTO: 8.8 % (ref 5–12)
NEUTROPHILS NFR BLD AUTO: 4.53 10*3/MM3 (ref 1.7–7)
NEUTROPHILS NFR BLD AUTO: 61.4 % (ref 42.7–76)
PLATELET # BLD AUTO: 246 10*3/MM3 (ref 140–450)
PMV BLD AUTO: 10.1 FL (ref 6–12)
POTASSIUM SERPL-SCNC: 4.6 MMOL/L (ref 3.5–5.2)
PROLACTIN SERPL-MCNC: 7.08 NG/ML (ref 4.04–15.2)
PROT SERPL-MCNC: 7.3 G/DL (ref 6–8.5)
RBC # BLD AUTO: 4.66 10*6/MM3 (ref 4.14–5.8)
SODIUM SERPL-SCNC: 139 MMOL/L (ref 136–145)
T4 FREE SERPL-MCNC: 1.08 NG/DL (ref 0.92–1.68)
TESTOST SERPL-MCNC: 148 NG/DL (ref 249–836)
TRIGL SERPL-MCNC: 80 MG/DL (ref 0–150)
TSH SERPL DL<=0.05 MIU/L-ACNC: 2.64 UIU/ML (ref 0.27–4.2)
VIT B12 BLD-MCNC: 918 PG/ML (ref 211–946)
VLDLC SERPL-MCNC: 16 MG/DL (ref 5–40)
WBC NRBC COR # BLD AUTO: 7.38 10*3/MM3 (ref 3.4–10.8)

## 2024-11-06 PROCEDURE — 83002 ASSAY OF GONADOTROPIN (LH): CPT

## 2024-11-06 PROCEDURE — 36415 COLL VENOUS BLD VENIPUNCTURE: CPT

## 2024-11-06 PROCEDURE — 83036 HEMOGLOBIN GLYCOSYLATED A1C: CPT

## 2024-11-06 PROCEDURE — 84439 ASSAY OF FREE THYROXINE: CPT

## 2024-11-06 PROCEDURE — 80050 GENERAL HEALTH PANEL: CPT

## 2024-11-06 PROCEDURE — 84146 ASSAY OF PROLACTIN: CPT

## 2024-11-06 PROCEDURE — 80061 LIPID PANEL: CPT

## 2024-11-06 PROCEDURE — 82306 VITAMIN D 25 HYDROXY: CPT

## 2024-11-06 PROCEDURE — 83001 ASSAY OF GONADOTROPIN (FSH): CPT

## 2024-11-06 PROCEDURE — 84403 ASSAY OF TOTAL TESTOSTERONE: CPT

## 2024-11-06 PROCEDURE — 82607 VITAMIN B-12: CPT

## 2024-11-07 ENCOUNTER — PATIENT ROUNDING (BHMG ONLY) (OUTPATIENT)
Dept: INTERNAL MEDICINE | Age: 34
End: 2024-11-07
Payer: COMMERCIAL

## 2024-11-27 ENCOUNTER — OFFICE VISIT (OUTPATIENT)
Dept: INTERNAL MEDICINE | Age: 34
End: 2024-11-27
Payer: COMMERCIAL

## 2024-11-27 VITALS
SYSTOLIC BLOOD PRESSURE: 124 MMHG | OXYGEN SATURATION: 94 % | TEMPERATURE: 98.2 F | HEIGHT: 71 IN | BODY MASS INDEX: 39.06 KG/M2 | HEART RATE: 84 BPM | WEIGHT: 279 LBS | DIASTOLIC BLOOD PRESSURE: 85 MMHG

## 2024-11-27 DIAGNOSIS — F32.A ANXIETY AND DEPRESSION: Primary | ICD-10-CM

## 2024-11-27 DIAGNOSIS — G47.33 OSA ON CPAP: ICD-10-CM

## 2024-11-27 DIAGNOSIS — E29.1 HYPOGONADISM, MALE: ICD-10-CM

## 2024-11-27 DIAGNOSIS — F51.01 PRIMARY INSOMNIA: ICD-10-CM

## 2024-11-27 DIAGNOSIS — E66.813 CLASS 3 SEVERE OBESITY DUE TO EXCESS CALORIES WITH SERIOUS COMORBIDITY AND BODY MASS INDEX (BMI) OF 40.0 TO 44.9 IN ADULT: ICD-10-CM

## 2024-11-27 DIAGNOSIS — E66.01 CLASS 3 SEVERE OBESITY DUE TO EXCESS CALORIES WITH SERIOUS COMORBIDITY AND BODY MASS INDEX (BMI) OF 40.0 TO 44.9 IN ADULT: ICD-10-CM

## 2024-11-27 DIAGNOSIS — F41.9 ANXIETY AND DEPRESSION: Primary | ICD-10-CM

## 2024-11-27 PROCEDURE — 99214 OFFICE O/P EST MOD 30 MIN: CPT | Performed by: INTERNAL MEDICINE

## 2024-11-27 RX ORDER — AZITHROMYCIN 250 MG/1
TABLET, FILM COATED ORAL
Qty: 6 TABLET | Refills: 0 | Status: SHIPPED | OUTPATIENT
Start: 2024-11-27 | End: 2024-12-02

## 2024-11-27 RX ORDER — SEMAGLUTIDE 0.5 MG/.5ML
0.5 INJECTION, SOLUTION SUBCUTANEOUS WEEKLY
Qty: 2 ML | Refills: 2 | Status: SHIPPED | OUTPATIENT
Start: 2024-11-27

## 2024-11-27 NOTE — PROGRESS NOTES
"CHIEF COMPLAINT/ HPI:  Anxiety (Routine follow up, Lab follow up. Medication refill, pt is needing [facility] due to [employer]. Pt is asking for something to help with sinus congestion and cough. Pt did have a negative covid test twice 4 days apart. Possible increase in dose for ozempic. Pt states feeling well on the injection, has stopped drinking energy drinks and drinking more water. )              Objective   Vital Signs  Vitals:    11/27/24 1552   BP: 124/85   BP Location: Left arm   Patient Position: Sitting   Pulse: 84   Temp: 98.2 °F (36.8 °C)   SpO2: 94%   Weight: 127 kg (279 lb)   Height: 179.1 cm (70.51\")      Body mass index is 39.45 kg/m².  Review of Systems   Constitutional: Negative.    HENT:  Positive for congestion and sinus pressure.    Eyes: Negative.    Respiratory: Negative.     Cardiovascular: Negative.    Gastrointestinal: Negative.    Endocrine: Negative.    Genitourinary: Negative.    Musculoskeletal: Negative.    Allergic/Immunologic: Negative.    Neurological: Negative.    Hematological: Negative.    Psychiatric/Behavioral: Negative.        Physical Exam  Constitutional:       General: He is not in acute distress.     Appearance: Normal appearance. He is obese.   HENT:      Head: Normocephalic.      Mouth/Throat:      Mouth: Mucous membranes are moist.   Eyes:      Conjunctiva/sclera: Conjunctivae normal.      Pupils: Pupils are equal, round, and reactive to light.   Cardiovascular:      Rate and Rhythm: Normal rate and regular rhythm.      Pulses: Normal pulses.      Heart sounds: Normal heart sounds.   Pulmonary:      Effort: Pulmonary effort is normal.      Breath sounds: Normal breath sounds.   Abdominal:      General: Bowel sounds are normal.      Palpations: Abdomen is soft.   Musculoskeletal:         General: No swelling. Normal range of motion.      Cervical back: Neck supple.   Skin:     General: Skin is warm and dry.      Coloration: " "Skin is not jaundiced.   Neurological:      General: No focal deficit present.      Mental Status: He is alert and oriented to person, place, and time. Mental status is at baseline.   Psychiatric:         Mood and Affect: Mood normal.         Behavior: Behavior normal.         Thought Content: Thought content normal.         Judgment: Judgment normal.        Result Review :   No results found for: \"PROBNP\", \"BNP\"  CMP          1/23/2024    11:15 11/6/2024    10:39   CMP   Glucose 93  87    BUN 16  12    Creatinine 0.87  0.99    EGFR 116.1  101.9    Sodium 139  139    Potassium 4.0  4.6    Chloride 103  105    Calcium 9.6  9.4    Total Protein 7.4  7.3    Albumin 4.3  4.3    Globulin 3.1  3.0    Total Bilirubin 0.4  0.4    Alkaline Phosphatase 86  87    AST (SGOT) 22  19    ALT (SGPT) 32  28    Albumin/Globulin Ratio 1.4  1.4    BUN/Creatinine Ratio 18.4  12.1    Anion Gap 7.5  7.5      CBC w/diff          1/23/2024    11:15 11/6/2024    10:39   CBC w/Diff   WBC 7.77  7.38    RBC 4.56  4.66    Hemoglobin 13.4  13.7    Hematocrit 40.9  42.2    MCV 89.7  90.6    MCH 29.4  29.4    MCHC 32.8  32.5    RDW 12.6  12.9    Platelets 244  246    Neutrophil Rel % 62.5  61.4    Immature Granulocyte Rel % 0.4  0.1    Lymphocyte Rel % 26.6  26.7    Monocyte Rel % 8.1  8.8    Eosinophil Rel % 1.8  2.3    Basophil Rel % 0.6  0.7       Lipid Panel          3/19/2024    13:05 11/6/2024    10:39   Lipid Panel   Total Cholesterol 128  127    Triglycerides 62  80    HDL Cholesterol 40  35    VLDL Cholesterol 13  16    LDL Cholesterol  75  76    LDL/HDL Ratio 1.89  2.17       Lab Results   Component Value Date    TSH 2.640 11/06/2024    TSH 1.350 01/23/2024    TSH 2.660 04/28/2021      Lab Results   Component Value Date    FREET4 1.08 11/06/2024      A1C Last 3 Results          1/23/2024    11:15 11/6/2024    10:39   HGBA1C Last 3 Results   Hemoglobin A1C 5.40  5.10                        Visit Diagnoses:    ICD-10-CM ICD-9-CM   1. " Anxiety and depression  F41.9 300.00    F32.A 311   2. Class 3 severe obesity due to excess calories with serious comorbidity and body mass index (BMI) of 40.0 to 44.9 in adult  E66.813 278.01    E66.01 V85.41    Z68.41    3. Hypogonadism, male  E29.1 257.2   4. Primary insomnia  F51.01 307.42   5. AVIVA on CPAP  G47.33 327.23       Assessment and Plan   Diagnoses and all orders for this visit:    1. Anxiety and depression (Primary)  -     CBC & Differential; Future  -     Comprehensive Metabolic Panel; Future  -     Lipid Panel; Future    2. Class 3 severe obesity due to excess calories with serious comorbidity and body mass index (BMI) of 40.0 to 44.9 in adult  -     CBC & Differential; Future  -     Comprehensive Metabolic Panel; Future  -     Lipid Panel; Future    3. Hypogonadism, male  -     CBC & Differential; Future  -     Comprehensive Metabolic Panel; Future  -     Lipid Panel; Future  -     Ambulatory Referral to Endocrinology    4. Primary insomnia  -     CBC & Differential; Future  -     Comprehensive Metabolic Panel; Future  -     Lipid Panel; Future    5. AVIVA on CPAP  -     CBC & Differential; Future  -     Comprehensive Metabolic Panel; Future  -     Lipid Panel; Future    Other orders  -     Semaglutide-Weight Management (Wegovy) 0.5 MG/0.5ML solution auto-injector; Inject 0.5 mL under the skin into the appropriate area as directed 1 (One) Time Per Week.  Dispense: 2 mL; Refill: 2  -     azithromycin (Zithromax Z-Stefan) 250 MG tablet; Take 2 tablets the first day, then 1 tablet daily for 4 days.  Dispense: 6 tablet; Refill: 0    Upper respiratory symptoms cough congestion possible acute bronchitis consider treatment options, Z-Stefan is sent in November 27, 2024,    Obesity, discussed wgt loss meds , GLP1   --WILL TRY WEGOVY SEND IN NOVEMBER 5, 2024, will send in a new prescription for Wegovy at 0.5 mg weekly dose November 27, 2024 patient will call in a month for an update and we can send that into the  [employer] where he ever he is working,    AVIVA, MILD , CONT CPAP    INSOMNIA, CONT SEROQUEL, 25 MG QHS     DEPRESSION, / BIPOLAR / PTSD -- UP AND DOWN, CONT ZOLOFT 100 MG QD AND SEROQUEL 25 MG QHS     HYPOGONADISM , total testosterone 148 low normal LH and FSH with normal prolactin level November 6, 2024 may need endocrinology appointment,, will refer to endocrinology for second opinion with the above-mentioned test results,    Vitamin D deficiency severe recommend vitamin D over-the-counter 2000 units to 5000 units daily             Follow Up   Return in about 6 months (around 5/27/2025).  Patient was given instructions and counseling regarding his condition or for health maintenance advice. Please see specific information pulled into the AVS if appropriate.

## 2024-12-16 ENCOUNTER — TELEPHONE (OUTPATIENT)
Dept: ENDOCRINOLOGY | Age: 34
End: 2024-12-16
Payer: COMMERCIAL

## 2024-12-17 ENCOUNTER — TELEPHONE (OUTPATIENT)
Dept: ENDOCRINOLOGY | Age: 34
End: 2024-12-17

## 2024-12-28 DIAGNOSIS — E66.813 CLASS 3 SEVERE OBESITY DUE TO EXCESS CALORIES WITH SERIOUS COMORBIDITY AND BODY MASS INDEX (BMI) OF 40.0 TO 44.9 IN ADULT: Primary | ICD-10-CM

## 2024-12-28 DIAGNOSIS — E66.01 CLASS 3 SEVERE OBESITY DUE TO EXCESS CALORIES WITH SERIOUS COMORBIDITY AND BODY MASS INDEX (BMI) OF 40.0 TO 44.9 IN ADULT: Primary | ICD-10-CM

## 2024-12-30 RX ORDER — SEMAGLUTIDE 0.5 MG/.5ML
0.5 INJECTION, SOLUTION SUBCUTANEOUS WEEKLY
Qty: 2 ML | Refills: 2 | Status: SHIPPED | OUTPATIENT
Start: 2024-12-30

## 2025-01-31 ENCOUNTER — TELEPHONE (OUTPATIENT)
Dept: INTERNAL MEDICINE | Age: 35
End: 2025-01-31
Payer: COMMERCIAL

## 2025-01-31 RX ORDER — ACYCLOVIR 400 MG/1
400 TABLET ORAL 2 TIMES DAILY
Qty: 60 TABLET | Refills: 5 | Status: SHIPPED | OUTPATIENT
Start: 2025-01-31

## 2025-02-01 NOTE — TELEPHONE ENCOUNTER
I spoke with the patient's wife tonight who would like to start patient and her  on suppressive HSV 1 medication, acyclovir 40 mg twice a day #60 with 5 refills was called into his pharmacy patient's wife is aware

## 2025-03-01 DIAGNOSIS — F41.9 ANXIETY AND DEPRESSION: ICD-10-CM

## 2025-03-01 DIAGNOSIS — F32.A ANXIETY AND DEPRESSION: ICD-10-CM

## 2025-03-03 RX ORDER — QUETIAPINE FUMARATE 25 MG/1
TABLET, FILM COATED ORAL
Qty: 30 TABLET | OUTPATIENT
Start: 2025-03-03

## 2025-03-04 DIAGNOSIS — F41.9 ANXIETY AND DEPRESSION: ICD-10-CM

## 2025-03-04 DIAGNOSIS — E66.01 CLASS 3 SEVERE OBESITY DUE TO EXCESS CALORIES WITH SERIOUS COMORBIDITY AND BODY MASS INDEX (BMI) OF 40.0 TO 44.9 IN ADULT: ICD-10-CM

## 2025-03-04 DIAGNOSIS — F32.A ANXIETY AND DEPRESSION: ICD-10-CM

## 2025-03-04 DIAGNOSIS — E66.813 CLASS 3 SEVERE OBESITY DUE TO EXCESS CALORIES WITH SERIOUS COMORBIDITY AND BODY MASS INDEX (BMI) OF 40.0 TO 44.9 IN ADULT: ICD-10-CM

## 2025-03-04 RX ORDER — SEMAGLUTIDE 0.5 MG/.5ML
0.5 INJECTION, SOLUTION SUBCUTANEOUS WEEKLY
Qty: 2 ML | Refills: 2 | Status: SHIPPED | OUTPATIENT
Start: 2025-03-04

## 2025-03-04 RX ORDER — QUETIAPINE FUMARATE 25 MG/1
TABLET, FILM COATED ORAL
Qty: 90 TABLET | Refills: 3 | Status: SHIPPED | OUTPATIENT
Start: 2025-03-04

## 2025-03-04 RX ORDER — SERTRALINE HYDROCHLORIDE 100 MG/1
100 TABLET, FILM COATED ORAL DAILY
Qty: 90 TABLET | Refills: 3 | Status: SHIPPED | OUTPATIENT
Start: 2025-03-04

## 2025-03-04 NOTE — TELEPHONE ENCOUNTER
Caller: Jamin Alves    Relationship: Self    Best call back number: [phone]     Requested Prescriptions:   Requested Prescriptions     Pending Prescriptions Disp Refills    QUEtiapine (SEROquel) 25 MG tablet 90 tablet 1     Sig: Take one half tablet at bedtime x 1 week then increase to 1 tablet at bedtime    sertraline (Zoloft) 100 MG tablet 90 tablet 1     Sig: Take 1 tablet by mouth Daily.    Semaglutide-Weight Management (Wegovy) 0.5 MG/0.5ML solution auto-injector 2 mL 2     Sig: Inject 0.5 mL under the skin into the appropriate area as directed 1 (One) Time Per Week.        Pharmacy where request should be sent: [facility] [id number] - [place] - [address] [facility] - [phone]  - [phone] FX     Last office visit with prescribing clinician: 11/27/2024   Last telemedicine visit with prescribing clinician: Visit date not found   Next office visit with prescribing clinician: 5/27/2025     Does the patient have less than a 3 day supply:  [x] Yes  [] No    Would you like a call back once the refill request has been completed: [x] Yes [] No    If the office needs to give you a call back, can they leave a voicemail: [x] Yes [] No    Pete Whatley   03/04/25 10:50 EST

## 2025-03-13 ENCOUNTER — TELEPHONE (OUTPATIENT)
Dept: INTERNAL MEDICINE | Age: 35
End: 2025-03-13
Payer: COMMERCIAL

## 2025-03-13 DIAGNOSIS — E66.01 CLASS 3 SEVERE OBESITY DUE TO EXCESS CALORIES WITH SERIOUS COMORBIDITY AND BODY MASS INDEX (BMI) OF 40.0 TO 44.9 IN ADULT: Primary | ICD-10-CM

## 2025-03-13 DIAGNOSIS — E66.813 CLASS 3 SEVERE OBESITY DUE TO EXCESS CALORIES WITH SERIOUS COMORBIDITY AND BODY MASS INDEX (BMI) OF 40.0 TO 44.9 IN ADULT: Primary | ICD-10-CM

## 2025-03-13 NOTE — TELEPHONE ENCOUNTER
Caller: Jamin Alves    Relationship: Self    Best call back number: [phone]     What is the best time to reach you: ANY    Who are you requesting to speak with (clinical staff, provider,  specific staff member): CLINICAL STAFF    What was the call regarding: PATIENT CALLED STATING THAT HIS INSURANCE WILL NEED HIS PROVIDER TO PRESCRIBE AN ALTERNATIVE MEDICATION FOR WEGOVY SO HIS INSURANCE WILL COVER IT.

## 2025-03-13 NOTE — TELEPHONE ENCOUNTER
Spoke w/pharmacy, stated insurance would need a prior auth.  Spoke w/pt made him aware of this, he asked if you wanted him to go to the next dose up.    Please advise.  Will start PA w/ decided dose.

## 2025-03-21 ENCOUNTER — TELEPHONE (OUTPATIENT)
Dept: INTERNAL MEDICINE | Age: 35
End: 2025-03-21
Payer: COMMERCIAL

## 2025-03-26 ENCOUNTER — TELEPHONE (OUTPATIENT)
Dept: INTERNAL MEDICINE | Age: 35
End: 2025-03-26

## 2025-03-26 NOTE — TELEPHONE ENCOUNTER
Caller: Jamin Alves    Relationship: Self    Best call back number: [phone]    Which medication are you concerned about: SAXENDA     What are your concerns: PATIENT STATED INSURANCE WILL COVER SAXENDA OR IS NEEDING  PA FOR WEGOVY. PATIENT STATED HIS INSURANCE HAS NOT RECEIVED ANY PA FOR WEGOVY.

## 2025-04-07 ENCOUNTER — PATIENT MESSAGE (OUTPATIENT)
Dept: INTERNAL MEDICINE | Age: 35
End: 2025-04-07
Payer: COMMERCIAL

## 2025-04-08 ENCOUNTER — TELEPHONE (OUTPATIENT)
Dept: INTERNAL MEDICINE | Age: 35
End: 2025-04-08
Payer: COMMERCIAL

## 2025-04-08 NOTE — TELEPHONE ENCOUNTER
Okay let him know I sent in Saxenda for 2 months only and then he has to come in and see me on May 27 as scheduled, I sent it to his pharmacy of record which I believe is [facility]

## 2025-05-05 ENCOUNTER — TELEPHONE (OUTPATIENT)
Dept: ENDOCRINOLOGY | Age: 35
End: 2025-05-05
Payer: COMMERCIAL

## 2025-06-26 ENCOUNTER — TELEPHONE (OUTPATIENT)
Dept: INTERNAL MEDICINE | Age: 35
End: 2025-06-26

## 2025-06-26 NOTE — TELEPHONE ENCOUNTER
FYI:  No call made as patient has rescheduled his late-cancel appt.  Policy letter being sent.     Aleah Stoddard Rep.